# Patient Record
(demographics unavailable — no encounter records)

---

## 2024-10-21 NOTE — HISTORY OF PRESENT ILLNESS
[de-identified] : Pt. is a 61 year old female who presents for evaluation of her RT foot. Reports chronic history of plantar fasciitis. She was doing well but since early 2024 pain has recurred. Reports steroid injection about 2-3 months ago without relief. Oral steroids were not helpful. WB in sneakers. She has tried orthotics, ice, topical creams. MRI (NW) plantar fasciitis, low grade partial tearing medial aspect of central cord, likely ganglion cyst (multiseptated cyst) 5th TMT joint.  [FreeTextEntry1] : Right Foot

## 2024-10-21 NOTE — PHYSICAL EXAM
[NL (40)] : plantar flexion 40 degrees [NL 30)] : inversion 30 degrees [5___] : eversion 5[unfilled]/5 [2+] : posterior tibialis pulse: 2+ [Normal] : saphenous nerve sensation normal [] : non-antalgic [Right] : right foot [Weight -] : weightbearing [There are no fractures, subluxations or dislocations. No significant abnormalities are seen] : There are no fractures, subluxations or dislocations. No significant abnormalities are seen [[id number]] : dorsiflexion 10 degrees [de-identified] : eversion 15 degrees

## 2024-10-21 NOTE — DISCUSSION/SUMMARY
[de-identified] : MRI RT foot with and without contrast to evaluate lateral foot mass.  WBAT in supportive footwear. Pt. recommended a course of PT. Stretching exercises demonstrated and recommended. Ice to affected area. Activity modification.

## 2024-11-12 NOTE — WORK
[Torn Ligament/Tendon/Muscle] : torn ligament, tendon or muscle [Was the competent medical cause of the injury] : was the competent medical cause of the injury [Are consistent with the injury] : are consistent with the injury [Consistent with my objective findings] : consistent with my objective findings [Total (100%)] : total (100%) [Does not reveal pre-existing condition(s) that may affect treatment/prognosis] : does not reveal pre-existing condition(s) that may affect treatment/prognosis [Cannot return to work because ________] : cannot return to work because [unfilled] [Unknown at this time] : : unknown at this time [Patient] : patient [No Rx restrictions] : No Rx restrictions. [I provided the services listed above] :  I provided the services listed above. [FreeTextEntry1] : guarded

## 2024-11-12 NOTE — IMAGING
[de-identified] : left wrist: no swelling/ecchymosis no ttp over FCR +TFCC Grind Test and ttp over this region. sudhir steele

## 2024-11-12 NOTE — ASSESSMENT
[FreeTextEntry1] : The patient was advised of the diagnosis. The natural history of the pathology was explained in full to the patient in layman's terms. All questions were answered. The risks and benefits of surgical and non-surgical treatment alternatives were explained in full to the patient.  OT has been declined by WC. we reviewed the anatomy, pathology, and treatment options for TFCC tears. We discussed that most of the TFCC is avascular and tears are slow to heal if at all but that surgical results are not guaranteed due to the poor healing capacity of the structure.  Even at surgery, most tears cannot be repaired, but are merely debrided.  We discussed the use of nsaids, bracing, rest, local modalities, injection and surgery in the treatment of TFCC tears. At this point, the patient will proceed with non-operative treatment.  F/u in 3 months

## 2024-11-12 NOTE — HISTORY OF PRESENT ILLNESS
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 States TFCC pain did not resolve with csi at last visit  7/22/2024: pt continues to have intermittent left TFCC pain. FCR pain resolved s/p CSI.  4/8/24:  Pt reports that she has no pain but has some weakness.  Therapy was cut off.  3/7/2024: Pt here for f/u of FCR tendinitis. Pt has been bracing and utlizing OT with no pain resolution. Pt also has hx of TFCC injury and has noted improvement.  2/27/24:  Pt has been wearing brace with mild improvement.  Therapy was denied.  2/6/24:  Pt injured her left wrist on 8/31/23 and wore brace for a month, is doing therapy, and received an injection 3 weeks ago but still has some pain.  Pt reports that the pain was only alleviated for 1 week after CSI.  Pt reports that she has weakness. [FreeTextEntry1] : left wrist

## 2024-11-26 NOTE — HEALTH RISK ASSESSMENT
[Former] : Former [0-4] : 0-4 [> 15 Years] : > 15 Years [Yes] : Yes [Monthly or less (1 pt)] : Monthly or less (1 point) [No] : In the past 12 months have you used drugs other than those required for medical reasons? No [de-identified] : UC in sept - bronchitis/PNA  [de-identified] :  quit in 30s-1/2 a pack for5 years

## 2024-11-26 NOTE — HISTORY OF PRESENT ILLNESS
[FreeTextEntry1] : follow up  [de-identified] : Nov 26 2024 10:30AM  61 year old female presents for follow up PMH: HLD    denying fam hx of CAD PSH: breast surgery, leg surgery  Soc hx: soc alcohol use, , former smoker   saw Ortho-  Dr. 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 [facility] Carm[facility]-Has dicyclomine as needed.- hasnt needed     is due for COL  Allergies: augmentin - liver failure  Meds- none  Supplements: vit C, / magnesium 250 glyc     Health Maintenance: Immunizations: got flu shot   tried melatonin

## 2024-11-26 NOTE — PLAN
[FreeTextEntry1] : All problems, medications and allergies were assessed and reviewed with the patient. The patients' blood was drawn in office and will be followed and evaluated for any issues. Patient was told to notify the office if any issues arise. Patient agreeable with plan  black coffee today    sleep hygiene measures reviewed  deep breathing and relaxation techniques reviewed

## 2025-02-04 NOTE — IMAGING
[de-identified] : left wrist: no swelling/ecchymosis no ttp over FCR +TFCC Grind Test and ttp over this region. sudhir steele

## 2025-02-04 NOTE — HISTORY OF PRESENT ILLNESS
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 States that pain has gotten a little worse since last time. Previous injection in 3/7/24 offered relief up until ~1 month ago.   11/12/24: Here for left wrist. States TFCC pain did not resolve with csi at last visit  7/22/2024: pt continues to have intermittent left TFCC pain. FCR pain resolved s/p CSI.  4/8/24:  Pt reports that she has no pain but has some weakness.  Therapy was cut off.  3/7/2024: Pt here for f/u of FCR tendinitis. Pt has been bracing and utlizing OT with no pain resolution. Pt also has hx of TFCC injury and has noted improvement.  2/27/24:  Pt has been wearing brace with mild improvement.  Therapy was denied.  2/6/24:  Pt injured her left wrist on 8/31/23 and wore brace for a month, is doing therapy, and received an injection 3 weeks ago but still has some pain.  Pt reports that the pain was only alleviated for 1 week after CSI.  Pt reports that she has weakness. [FreeTextEntry1] : left wrist

## 2025-02-04 NOTE — ASSESSMENT
[FreeTextEntry1] : The patient was advised of the diagnosis. The natural history of the pathology was explained in full to the patient in layman's terms. All questions were answered. The risks and benefits of surgical and non-surgical treatment alternatives were explained in full to the patient.  OT has been declined by WC. we reviewed the anatomy, pathology, and treatment options for TFCC tears. We discussed that most of the TFCC is avascular and tears are slow to heal if at all but that surgical results are not guaranteed due to the poor healing capacity of the structure.  Even at surgery, most tears cannot be repaired, but are merely debrided.  We discussed the use of nsaids, bracing, rest, local modalities, injection and surgery in the treatment of TFCC tears. At this point, the patient will proceed with non-operative treatment.  We discussed that too many injections may lead to weakening o the tendon/tendon rupture and the safety of two injections. After a discussion of the risks, benefits and alternatives along with the expectations, the patient was amenable to injection. The indication for injection is pain and inflammation. The skin overlying the tendon sheath was prepared with alcohol and ethyl chloride was sprayed topically. Sterile technique was used. An injection of 1ml of lidocaine and 6mg of betamethasone was used. The patient was instructed to call if redness, pain or fever occur. They may apply ice for 15 minutes every hour for the next 12-24 hours as tolerated. The patient understands that it may take 2-5 days to see a noticeable difference. Sterile Band-Aid was applied.  Medium joint injection was performed of the left wrist. The indication for this procedure was pain and inflammation. The site was prepped with alcohol and ethyl chloride sprayed topically. An injection of Lidocaine 1cc of 1% was used Betamethasone (Celestone) 1cc of 6mg.  Patient was advised to call if redness, pain or fever occur and apply ice for 15 minutes out of every hour for the next 12-24 hours as tolerated. The risks benefits, and alternatives have been discussed, and verbal consent was obtained  Pt was given CSI in left wrist brachioradialis Patient was given CSI in left wrist TFCC F/u in 4 weeks

## 2025-02-07 NOTE — HISTORY OF PRESENT ILLNESS
[de-identified] : Patient returns for her RT foot. Reports chronic history of plantar fasciitis. She was doing well but since early 2024 pain has recurred.  She has tried orthotics, ice, topical creams. MRI (NW) plantar fasciitis, low grade partial tearing medial aspect of central cord, likely ganglion cyst (multiseptated cyst) 5th TMT joint.  Since last visit she has tried PT and home exercises.  Celebrex helped but she ran out.  The dorsolateral cyst no longer bothers her but she still has plantar heel pain. [FreeTextEntry1] : Right Foot

## 2025-02-07 NOTE — PROCEDURE
[FreeTextEntry3] : Patient has tried OTC's including aspirin, Ibuprofen, Aleve, etc or prescription NSAIDS, and/or exercises at home and/or physical therapy without satisfactory response. A steroid injection to suppress acute inflammation was discussed. The risks benefits, and alternatives have been discussed, and verbal consent was obtained.    The risks, benefits and contents of the injection have been discussed.  Risks include but are not limited to allergic reaction, flare reaction, permanent white skin discoloration at the injection site and infection.  The patient understands the risks and agrees to having the injection.  All questions have been answered.   An injection of the right plantar fascia insertion was performed. The indication for this procedure was pain and inflammation. The site was prepped with alcohol and sterile technique used. An injection of 1cc Lidocaine 1% , 1cc of Ropivacaine 0.5% , and 1cc of 80mg Methylprednisolone (Depomedrol) was used. Patient tolerated procedure well. Patient was advised to call if redness, pain, or fever occur, apply ice for 15 minutes out of every hour for the next 12-24 hours as tolerated and patient was advised to rest the joint(s) for 3 days.   Ultrasound guidance was indicated for this patient due to inflammation . Plantar fascia thickness measured 0.54cm.  All ultrasound images have been permanently captured and stored accordingly in our picture archiving and communication system. Visualization of the needle and placement of injection was performed without complication.   Patient has been advised to ice the plantar heel for multiple 20 minute intervals throughout the day. No sports, running, jumping or exercise activities should be done until re-evaluated. Only light stretching exercises are permitted.

## 2025-02-07 NOTE — DISCUSSION/SUMMARY
[de-identified] : The ganglion cyst has resolved and no further treatment is needed. Plantar fascia injection offered and she would like to proceed. Continue with home stretching in one week. Celebrex prescribed. The patient was explained the options as well as benefits of over the counter verses prescription strength nonsteroidal anti-inflammatory medication. Prescription strength medication is recommended to suppress chronic inflammation. The patient opts for a prescription strength medication.  Jarod recommended.

## 2025-02-07 NOTE — PHYSICAL EXAM
[NL (40)] : plantar flexion 40 degrees [NL 30)] : inversion 30 degrees [5___] : eversion 5[unfilled]/5 [2+] : posterior tibialis pulse: 2+ [Normal] : saphenous nerve sensation normal [Right] : right foot [Weight -] : weightbearing [There are no fractures, subluxations or dislocations. No significant abnormalities are seen] : There are no fractures, subluxations or dislocations. No significant abnormalities are seen [NL (20)] : eversion 20 degrees [] : non-antalgic

## 2025-03-13 NOTE — PLAN
[FreeTextEntry1] : Examination Pap smear was done.  Patient was given prescription for bone density mammogram and sonogram.  Vaginal dryness and dyspareunia was addressed patient wants to try over-the-counter medication which was discussed with the patient in detail

## 2025-03-13 NOTE — HISTORY OF PRESENT ILLNESS
[Mammogramdate] : 2024 [PapSmeardate] : 2024 [BoneDensityDate] : 2020 [ColonoscopyDate] : 2021 [Difficulty with [place]] : difficulty with intercourse [Options Discussed] : options discussed [Vaginal Lubrication] : vaginal lubrication [Currently In Menopause] : currently in menopause [Menopause Age: ____] : age at menopause was [unfilled] [Yes] : Patient has concerns regarding sex [FreeTextEntry1] : Painful intercourse secondary to vaginal dryness

## 2025-03-18 NOTE — HISTORY OF PRESENT ILLNESS
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 Patient carries a FH of HTN ( Parents). HLD ( dad), and personal history of hyperlipidemia since 2020 ( 11/2024: - Fasting ). Patient endorses to eating a well balanced diet low in fat. In the past was unable to exercise due to Foot issues which now is resolved. Addl, reports occ fluttering in her chest that she notices from time to time. Denies chest pain, shortness of breath, dizziness, lightheadedness, or near syncope or syncope, orthopnea, PND and increasing lower extremity edema.  Isolated elevated BP reading today, according to patient usually her BPs are WNL.   # HLD: Check Lipids ( fasting ) if elevated will consider statin therapy.  Encouraged patient to continue healthy exercise and eating habits, focusing on a Mediterranean style of eating and aiming for the recommended 150 minutes per week of moderate physical activity.   # Echocardiogram to assess the structural and valvular function. Cardiac CT to R/o Ischemia etiology

## 2025-03-18 NOTE — DISCUSSION/SUMMARY
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 Patient carries a FH of HTN ( Parents). HLD ( dad), and personal history of hyperlipidemia since 2020 ( 11/2024: - Fasting ).   # HLD: Check Lipids ( fasting ) if elevated will consider statin therapy.  Encouraged patient to continue healthy exercise and eating habits, focusing on a Mediterranean style of eating and aiming for the recommended 150 minutes per week of moderate physical activity.   # Echocardiogram to assess the structural and valvular function. Cardiac CT to R/o Ischemia etiology

## 2025-03-31 NOTE — DISCUSSION/SUMMARY
[FreeTextEntry1] : HARDY is a 61 year female who presents for On exam, negative CST, normal PVR, no POP.   [] []   f/u All questions answered.

## 2025-04-01 NOTE — ADDENDUM
[FreeTextEntry1] : This note was written by Albertina Thomas, acting as the  for Dr. Lambert. This note accurately reflects the work and decisions made by Dr. Lambert.

## 2025-04-01 NOTE — PHYSICAL EXAM
[Chaperone Present] : A chaperone was present in the examining room during all aspects of the physical examination [No Acute Distress] : in no acute distress [Oriented x3] : oriented to person, place, and time [None] : no CVA tenderness [Labia Majora] : were normal [Labia Minora] : were normal [Bartholin's Gland] : both Bartholin's glands were normal  [Normal Appearance] : general appearance was normal [No Bleeding] : there was no active vaginal bleeding [2] : 2 [] : 0 [Normal] : normal [Soft] :  the cervix was soft [Post Void Residual ____ml] : post void residual was [unfilled] ml [Exam Deferred] : was deferred [Tenderness] : ~T no ~M abdominal tenderness observed [Distended] : not distended [FreeTextEntry3] : empty supine CST neg [FreeTextEntry4] : no mass, no lesion, no cyst

## 2025-04-01 NOTE — HISTORY OF PRESENT ILLNESS
[FreeTextEntry1] : HARDY is a 61 year female who presents for many years of bothersome urinary urgency with urgency leakage requiring liner use, frequency in the day, nocturia 2-3 however sometimes wakes her other times not. Leakage also present with cough sneeze however less bothersome than with urgency. No dysuria, UTIs, gross hematuria, incomplete emptying. No bulge or pressure in the vagina. Normal caliber bowel movements.   HgbA1c 2024 - 5.5   OB:  x 3 PMH: high chol, diverticulosis, prior liver disease PSH: breast duct removal, thigh mass removal Prior smoker Allg Augmentin

## 2025-04-01 NOTE — OB HISTORY
[Vaginal ___] : [unfilled] vaginal delivery(s) [Definite ___ (Date)] : the last menstrual period was [unfilled] [Last Pap Smear ___] : date of last pap smear was on [unfilled] [Abnormal Pap Smear] : normal pap smear

## 2025-04-01 NOTE — PROCEDURE
[Straight Catheterization] : insertion of a straight catheter [Stress Incontinence] : stress incontinence [Urgent Incontinence] : urgent incontinence [Urinary Frequency] : urinary frequency [Patient] : the patient [None] : none [___ Fr Straight Tip] : a [unfilled] in [place] straight tip catheter [Clear] : clear [No Complications] : no complications [Tolerated Well] : the patient tolerated the procedure well [Post procedure instructions and information given] : Post procedure instructions and information were given and reviewed with patient. [1] : 1 [FreeTextEntry1] : cathed to obtain PVR and uncontaminated specimen

## 2025-04-01 NOTE — PROCEDURE
[Straight Catheterization] : insertion of a straight catheter [Stress Incontinence] : stress incontinence [Urgent Incontinence] : urgent incontinence [Urinary Frequency] : urinary frequency [Patient] : the patient [None] : none [___ Fr Straight Tip] : a [unfilled] in [place] straight tip catheter [Clear] : clear [No Complications] : no complications [Tolerated Well] : the patient tolerated the procedure well [Post procedure instructions and information given] : Post procedure instructions and information were given and reviewed with patient. [1] : 1 [FreeTextEntry1] : cathed to obtain PVR and uncontaminated specimen

## 2025-04-01 NOTE — ASSESSMENT
[FreeTextEntry1] : 62 yo  P3 with KIRSTY, OAB-wet > BRADLY. On exam, CST neg, PVR was normal, and there was no POP on POPQ.   The patient has urinary symptoms consistent with overactive bladder. The etiology of OAB was discussed. Management options including observation, behavioral modifications (dietary changes, monitoring fluid intake, bladder training, timed voids, use of pads/protective garments), kegels, PT, medications, PTNS, SNS, and bladder Botox were all reviewed.  The patient has symptoms consistent with stress urinary incontinence. The etiology of BRADLY was discussed. Management options including observation, behavioral modifications, medication, pessary, Impressa insert, periurethral bulking via cystoscopy, and surgery with midurethral sling were reviewed.   She will think about options and RTO prn. Rx for pelvic floor PT given as she likely may want to start with that before considering PTNS > bladder botox injections. All ques answered.

## 2025-05-01 NOTE — PHYSICAL EXAM
[Alert] : alert [Oriented x 3] : ~L oriented x 3 [Well Nourished] : well nourished [Conjunctiva Non-injected] : conjunctiva non-injected [No Visual Lymphadenopathy] : no visual  lymphadenopathy [No Clubbing] : no clubbing [No Edema] : no edema [No Bromhidrosis] : no bromhidrosis [No Chromhidrosis] : no chromhidrosis [FreeTextEntry3] : The patient is well-appearing, in no acute distress, alert and oriented x 3. Mood and affect are normal. A complete cutaneous examination of the scalp, face, neck, chest, abdomen, back, bilateral arms, bilateral legs, buttocks, digits, nails, eyelids, conjunctiva and lips reveals the following significant findings: - Skin colored soft papule on the L neck -Tan to dark brown macules on the trunk and extremities with no concerning dermoscopic features

## 2025-05-01 NOTE — HISTORY OF PRESENT ILLNESS
[FreeTextEntry1] : skin tag [de-identified] : 61F with no personal hx of skin cancer here for irritated skin tag on the L neck. Also here for skin check

## 2025-05-01 NOTE — ASSESSMENT
[FreeTextEntry1] : #NUB - L neck r/o FEP v. IDN Shave Biopsy: All side effects including pain, bleeding, infection, scar, recurrence, nerve damage were discussed and consent was obtained.   We anesthetized the area with 1% lidocaine/epinephrine.   The lesion was biopsied with a Dermablade.   Hemostasis was achieved with Drysol.   Wound care were provided and the tissue was submitted to pathology for evaluation.   #Nevi None c/f malignancy -Sun protection was discussed. The proper use of broad-spectrum UVB/UVA sunscreens with SPF 30 or greater was reviewed and the need for re-application after swimming or sweating or 3-4 hours was emphasized. We talked about judicious use of clothing and avoidance of peak periods of sun exposure. I made the patient aware of the need for year-round protection. ABCDEs of melanoma were also reviewed. -Self-skin checks were also reviewed  -Counseled patient to monitor for changes   #TBSE -ABCDEs of melanoma, sun safety with OTC SPF 30+, and self-skin checks reviewed -Counseled patient to notify us of any changing lesions -RTC 12 months, sooner PRN

## 2025-06-02 NOTE — PLAN
[FreeTextEntry1] : # HLD: discussed diet/excercise continue  # overweight: does not meet criteria currently for GLP 1s will think about it # CPE: utd mammo/pap/colonoscopy (2024)

## 2025-06-02 NOTE — HEALTH RISK ASSESSMENT
[Very Good] : ~his/her~  mood as very good [No] : No [0] : 2) Feeling down, depressed, or hopeless: Not at all (0) [PHQ-2 Negative - No further assessment needed] : PHQ-2 Negative - No further assessment needed [de-identified] : walking [de-identified] : well balanced  [[id number]] : 0 [Never] : Never

## 2025-06-02 NOTE — HISTORY OF PRESENT ILLNESS
[FreeTextEntry1] : CPE  [de-identified] : 60 yo hx HLD here for CPE.  feels well.  prescribed crestor for hld didnt take it went to cardiologist had CT calcium score which was 0. has tried diet/excercise. hesitant about statin. has been struggling to lose weight. inquiring about GLP1

## 2025-06-10 NOTE — IMAGING
[de-identified] : left wrist: no swelling/ecchymosis no ttp over FCR no ttp over brachioradialis region s/p previous CSI last visit +TFCC Grind Test and ttp over this region. farom with 5/5 strength nvid

## 2025-06-10 NOTE — WORK
[Torn Ligament/Tendon/Muscle] : torn ligament, tendon or muscle [Was the competent medical cause of the injury] : was the competent medical cause of the injury [Are consistent with the injury] : are consistent with the injury [Consistent with my objective findings] : consistent with my objective findings [Does not reveal pre-existing condition(s) that may affect treatment/prognosis] : does not reveal pre-existing condition(s) that may affect treatment/prognosis [Patient] : patient [No Rx restrictions] : No Rx restrictions. [I provided the services listed above] :  I provided the services listed above. [Mild Partial] : mild partial [Can return to work without limitations on ______] : can return to work without limitations on [unfilled] [N/A] : : Not Applicable [FreeTextEntry1] : guarded

## 2025-06-10 NOTE — ASSESSMENT
[FreeTextEntry1] : The patient was advised of the diagnosis. The natural history of the pathology was explained in full to the patient in layman's terms. All questions were answered. The risks and benefits of surgical and non-surgical treatment alternatives were explained in full to the patient.  OT rx provided  F/u in 6 weeks

## 2025-06-10 NOTE — HISTORY OF PRESENT ILLNESS
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 (radial sided pain has resolved. Pt still with pain to the TFCC region)  Pt currently working. Still with ulnar sided left wrist, intermittent pain with ROM/lifting.    02/04/2025: Patient here for follow up of left wrist. States that pain has gotten a little worse since last time. Previous injection in 3/7/24 offered relief up until ~1 month ago.   11/12/24: Here for left wrist. States TFCC pain did not resolve with csi at last visit  7/22/2024: pt continues to have intermittent left TFCC pain. FCR pain resolved s/p CSI.  4/8/24:  Pt reports that she has no pain but has some weakness.  Therapy was cut off.  3/7/2024: Pt here for f/u of FCR tendinitis. Pt has been bracing and utlizing OT with no pain resolution. Pt also has hx of TFCC injury and has noted improvement.  2/27/24:  Pt has been wearing brace with mild improvement.  Therapy was denied.  2/6/24:  Pt injured her left wrist on 8/31/23 and wore brace for a month, is doing therapy, and received an injection 3 weeks ago but still has some pain.  Pt reports that the pain was only alleviated for 1 week after CSI.  Pt reports that she has weakness. [FreeTextEntry1] : left wrist